# Patient Record
Sex: MALE | Race: WHITE | NOT HISPANIC OR LATINO | URBAN - METROPOLITAN AREA
[De-identification: names, ages, dates, MRNs, and addresses within clinical notes are randomized per-mention and may not be internally consistent; named-entity substitution may affect disease eponyms.]

---

## 2017-02-02 ENCOUNTER — OUTPATIENT (OUTPATIENT)
Dept: OUTPATIENT SERVICES | Facility: HOSPITAL | Age: 22
LOS: 1 days | Discharge: HOME | End: 2017-02-02

## 2017-06-27 DIAGNOSIS — Z79.899 OTHER LONG TERM (CURRENT) DRUG THERAPY: ICD-10-CM

## 2017-07-10 ENCOUNTER — OUTPATIENT (OUTPATIENT)
Dept: OUTPATIENT SERVICES | Facility: HOSPITAL | Age: 22
LOS: 1 days | Discharge: HOME | End: 2017-07-10

## 2017-07-10 DIAGNOSIS — Z00.00 ENCOUNTER FOR GENERAL ADULT MEDICAL EXAMINATION WITHOUT ABNORMAL FINDINGS: ICD-10-CM

## 2018-07-15 ENCOUNTER — OUTPATIENT (OUTPATIENT)
Dept: OUTPATIENT SERVICES | Facility: HOSPITAL | Age: 23
LOS: 1 days | Discharge: HOME | End: 2018-07-15

## 2018-07-15 DIAGNOSIS — I82.409 ACUTE EMBOLISM AND THROMBOSIS OF UNSPECIFIED DEEP VEINS OF UNSPECIFIED LOWER EXTREMITY: ICD-10-CM

## 2018-07-15 DIAGNOSIS — M79.604 PAIN IN RIGHT LEG: ICD-10-CM

## 2018-07-15 DIAGNOSIS — M79.605 PAIN IN LEFT LEG: ICD-10-CM

## 2020-10-11 ENCOUNTER — TRANSCRIPTION ENCOUNTER (OUTPATIENT)
Age: 25
End: 2020-10-11

## 2022-08-03 PROBLEM — Z00.00 ENCOUNTER FOR PREVENTIVE HEALTH EXAMINATION: Status: ACTIVE | Noted: 2022-08-03

## 2022-10-13 ENCOUNTER — APPOINTMENT (OUTPATIENT)
Dept: NEUROLOGY | Facility: CLINIC | Age: 27
End: 2022-10-13

## 2022-10-13 VITALS
HEIGHT: 60 IN | SYSTOLIC BLOOD PRESSURE: 141 MMHG | BODY MASS INDEX: 59.68 KG/M2 | DIASTOLIC BLOOD PRESSURE: 100 MMHG | WEIGHT: 304 LBS | HEART RATE: 120 BPM

## 2022-10-13 VITALS — HEIGHT: 68 IN | BODY MASS INDEX: 46.22 KG/M2

## 2022-10-13 DIAGNOSIS — R56.9 UNSPECIFIED CONVULSIONS: ICD-10-CM

## 2022-10-13 DIAGNOSIS — F84.0 AUTISTIC DISORDER: ICD-10-CM

## 2022-10-13 PROCEDURE — 99213 OFFICE O/P EST LOW 20 MIN: CPT

## 2022-10-13 RX ORDER — ARIPIPRAZOLE 10 MG/1
10 TABLET ORAL DAILY
Qty: 90 | Refills: 1 | Status: ACTIVE | COMMUNITY
Start: 2022-10-13 | End: 1900-01-01

## 2022-10-13 RX ORDER — DIVALPROEX SODIUM 500 MG/1
500 TABLET, DELAYED RELEASE ORAL TWICE DAILY
Qty: 180 | Refills: 1 | Status: ACTIVE | COMMUNITY
Start: 2022-10-13 | End: 1900-01-01

## 2022-10-13 NOTE — HISTORY OF PRESENT ILLNESS
[FreeTextEntry1] : History of Present Illness:  I had the pleasure of seeing Mr. Angeles accompanied by his mom today in follow up. His previous history and physical findings have been reviewed.\par \par He is under our care in regards to his epilepsy that is controlled on carbamazepine as well as ADHD and autistic spectrum disorder which are chronic conditions he is receiving continuing active treatment for. As mentioned, he is seizure free on carbamazepine 200 mg, he only takes one capsule in the evening. He has had no seizures. He is on carbamazepine for years and as a result, we would like to continue as is without change.\par \par With respect to his ADHD and mood issues, he was previously under the care of a psychiatrist who added Abilify 10 mg daily to address his mood issues which he takes in conjunction with Depakote er 500 mg BID and Strattera 60mg which have somewhat helped his behavior and focus. This psychiatrist has since retired.

## 2022-10-13 NOTE — ASSESSMENT
[FreeTextEntry1] : This is a 25 year old male with Seizures and Autism controlled with medication. We will continue carbamazepine as mentioned and f/u in 6 months for re evaluation. In regards to the medication prescribed by his psychiatrist specifically ,Abilify 10 mg,  we will refill for a 6 months only, will be further monitored and prescribed by a new psychiatrist as previous retired. Patient will complete bloodwork at this time. If there is any issue patient's mother is aware she can contact the office.\par \par Berenice SOLIS, attest that this documentation has been prepared under the direction and in the presence of Provider Bassam Fuchs DO\marcela \par \par Thank you for allowing me to assist in the management of this patient.\par \par \par Bassam Fuchs DO\marcela Board Certified, Neurology\par

## 2023-02-15 ENCOUNTER — RX RENEWAL (OUTPATIENT)
Age: 28
End: 2023-02-15

## 2023-04-13 RX ORDER — CARBAMAZEPINE 200 MG/1
200 CAPSULE, EXTENDED RELEASE ORAL
Qty: 90 | Refills: 1 | Status: ACTIVE | COMMUNITY
Start: 2022-10-13 | End: 1900-01-01

## 2023-04-21 ENCOUNTER — APPOINTMENT (OUTPATIENT)
Dept: PULMONOLOGY | Facility: CLINIC | Age: 28
End: 2023-04-21

## 2023-05-16 DIAGNOSIS — F90.9 ATTENTION-DEFICIT HYPERACTIVITY DISORDER, UNSPECIFIED TYPE: ICD-10-CM

## 2023-05-16 RX ORDER — ATOMOXETINE 60 MG/1
60 CAPSULE ORAL
Qty: 90 | Refills: 1 | Status: ACTIVE | COMMUNITY
Start: 2022-10-13 | End: 1900-01-01

## 2023-05-17 ENCOUNTER — APPOINTMENT (OUTPATIENT)
Dept: NEUROLOGY | Facility: CLINIC | Age: 28
End: 2023-05-17

## 2023-05-22 ENCOUNTER — APPOINTMENT (OUTPATIENT)
Dept: ORTHOPEDIC SURGERY | Facility: CLINIC | Age: 28
End: 2023-05-22
Payer: MEDICARE

## 2023-05-22 ENCOUNTER — NON-APPOINTMENT (OUTPATIENT)
Age: 28
End: 2023-05-22

## 2023-05-22 DIAGNOSIS — S93.492A SPRAIN OF OTHER LIGAMENT OF LEFT ANKLE, INITIAL ENCOUNTER: ICD-10-CM

## 2023-05-22 PROCEDURE — 99203 OFFICE O/P NEW LOW 30 MIN: CPT | Mod: 25

## 2023-05-22 PROCEDURE — L4350: CPT | Mod: LT

## 2023-05-22 PROCEDURE — 29075 APPL CST ELBW FNGR SHORT ARM: CPT

## 2023-05-22 PROCEDURE — 73110 X-RAY EXAM OF WRIST: CPT | Mod: LT

## 2023-05-22 PROCEDURE — 73610 X-RAY EXAM OF ANKLE: CPT | Mod: LT

## 2023-05-22 NOTE — HISTORY OF PRESENT ILLNESS
[de-identified] : 27-year-old male here for an evaluation of injury sustained to the left wrist and left ankle.\par Patient fell down about a week ago injuring his left knee, left ankle and left wrist.\par Patient has been nursing the medial knee feels better, at this time he continues having pain on his left wrist and left ankle.\par

## 2023-05-22 NOTE — DISCUSSION/SUMMARY
[de-identified] : Impression: \par Left ankle sprain.\par Injury to the left wrist, suspecting a nondisplaced fracture of the cuboid.\par \par Plan:\par Left ankle: Patient was advised for the use of an Aircast.\par Patient was advised for the use of the Aircast for about 2 to 3 weeks.\par Weightbearing as tolerated,  activity as tolerated.\par \par Left wrist possible nondisplaced fracture of the scaphoid.\par Patient was advised for immobilization with a thumb spica cast, patient agrees.\par Patient was placed in a short arm cast, cast was placed from from the elbow to the hand.\par Fiberglass material was used.\par Patient was advised to keep the cast clean and dry.\par Patient was also advised for an MRI of the left wrist to confirm the nondisplaced fracture of the scaphoid.\par \par At this time patient is unable to return to work until further notice.\par \par \par Follow-up: 2 weeks for repeat evaluation with our hand specialist\par \par

## 2023-05-22 NOTE — IMAGING
[de-identified] : On examination of the left wrist, patient has some swelling on about the radial aspect of the carpal bones.\par Patient has good range of motion to dorsiflexion and volar flexion with mild end of range pain.\par Patient has good range of motion about the digits.\par No significant tenderness when palpating over the distal radius.\par The point of maximal tenderness is about the snuffbox.\par Neurovascular intact.\par \par On examination of the left ankle, patient has mild swelling over the lateral aspect of the ankle.\par No ecchymosis, no erythema.\par Calf soft, nontender.\par Patient has tender when palpating over the ATFL, nontender about the CFL or the PT FL.\par Nontender the deltoid ligament.\par Nontender over the medial or lateral malleolus.\par No signs of instability about the ankle.\par No pain when stressing the lateral ligaments.\par Neurovascular intact.\par \par X-ray of the left wrist with scaphoid view shows a lucency on about the scaphoid suggesting a nondisplaced fracture of the scaphoid\par \par X-ray of the left ankle, negative for any acute fracture or dislocation.

## 2023-05-24 ENCOUNTER — NON-APPOINTMENT (OUTPATIENT)
Age: 28
End: 2023-05-24

## 2023-06-01 ENCOUNTER — APPOINTMENT (OUTPATIENT)
Dept: ORTHOPEDIC SURGERY | Facility: CLINIC | Age: 28
End: 2023-06-01

## 2023-06-08 ENCOUNTER — APPOINTMENT (OUTPATIENT)
Dept: ORTHOPEDIC SURGERY | Facility: CLINIC | Age: 28
End: 2023-06-08
Payer: MEDICARE

## 2023-06-08 ENCOUNTER — NON-APPOINTMENT (OUTPATIENT)
Age: 28
End: 2023-06-08

## 2023-06-08 VITALS — HEIGHT: 68 IN | WEIGHT: 304 LBS | BODY MASS INDEX: 46.07 KG/M2

## 2023-06-08 DIAGNOSIS — M25.532 PAIN IN LEFT WRIST: ICD-10-CM

## 2023-06-08 PROCEDURE — L3809: CPT | Mod: LT

## 2023-06-08 PROCEDURE — 99212 OFFICE O/P EST SF 10 MIN: CPT

## 2023-06-08 NOTE — HISTORY OF PRESENT ILLNESS
[de-identified] : 27-year-old male had a fall resulting injury to his left wrist.  He was seen in the office to concern for fracture and he comes in for evaluation today.  MRI was ordered and it is been done as of yet.  He is not complaining of any pain or discomfort in the cast.  MRI is pending for next week.

## 2023-06-08 NOTE — DATA REVIEWED
[FreeTextEntry1] : I reviewed his previous radiographs showing no fracture dislocation no destructive lesions

## 2023-06-08 NOTE — PHYSICAL EXAM
[de-identified] : I removed him from his cast.  She has no swelling erythema ecchymoses or abrasions.  Patient has no anatomic snuffbox tenderness pain.  Patient has no pain about the scaphoid.

## 2023-06-08 NOTE — ASSESSMENT
[FreeTextEntry1] : 27-year-old male with left-sided wrist pain discomfort.  MRI is pending.  I placed him into a thumb spica brace he will get the MRI done.  Then if the MRI shows a fracture he will be placed into a waterproof cast.  If the MRI does not show a fracture he can be discharged from care.  We gave the option of staying in the cast while we await the MRI but after discussion with the patient and his mother and myself we elected to remove the cast and then if the MRI shows a fracture be recasted the need for a cast for this type of injury was discussed

## 2023-06-14 ENCOUNTER — RESULT REVIEW (OUTPATIENT)
Age: 28
End: 2023-06-14

## 2023-06-14 ENCOUNTER — OUTPATIENT (OUTPATIENT)
Dept: OUTPATIENT SERVICES | Facility: HOSPITAL | Age: 28
LOS: 1 days | End: 2023-06-14
Payer: MEDICARE

## 2023-06-14 DIAGNOSIS — Z00.8 ENCOUNTER FOR OTHER GENERAL EXAMINATION: ICD-10-CM

## 2023-06-14 DIAGNOSIS — M25.532 PAIN IN LEFT WRIST: ICD-10-CM

## 2023-06-14 PROCEDURE — 73221 MRI JOINT UPR EXTREM W/O DYE: CPT | Mod: 26,LT

## 2023-06-14 PROCEDURE — 73221 MRI JOINT UPR EXTREM W/O DYE: CPT | Mod: LT

## 2023-06-15 ENCOUNTER — NON-APPOINTMENT (OUTPATIENT)
Age: 28
End: 2023-06-15

## 2023-06-15 DIAGNOSIS — M25.532 PAIN IN LEFT WRIST: ICD-10-CM

## 2024-03-21 ENCOUNTER — APPOINTMENT (OUTPATIENT)
Dept: CARDIOLOGY | Facility: CLINIC | Age: 29
End: 2024-03-21
Payer: MEDICARE

## 2024-03-21 VITALS
SYSTOLIC BLOOD PRESSURE: 124 MMHG | OXYGEN SATURATION: 94 % | WEIGHT: 298 LBS | DIASTOLIC BLOOD PRESSURE: 86 MMHG | HEART RATE: 112 BPM | HEIGHT: 69 IN | BODY MASS INDEX: 44.14 KG/M2

## 2024-03-21 DIAGNOSIS — R73.03 PREDIABETES.: ICD-10-CM

## 2024-03-21 DIAGNOSIS — E66.01 MORBID (SEVERE) OBESITY DUE TO EXCESS CALORIES: ICD-10-CM

## 2024-03-21 DIAGNOSIS — E78.1 PURE HYPERGLYCERIDEMIA: ICD-10-CM

## 2024-03-21 PROCEDURE — 93000 ELECTROCARDIOGRAM COMPLETE: CPT

## 2024-03-21 PROCEDURE — 99204 OFFICE O/P NEW MOD 45 MIN: CPT

## 2024-03-21 PROCEDURE — G2211 COMPLEX E/M VISIT ADD ON: CPT

## 2024-03-21 RX ORDER — ROSUVASTATIN CALCIUM 5 MG/1
5 TABLET, FILM COATED ORAL
Qty: 30 | Refills: 5 | Status: ACTIVE | COMMUNITY
Start: 2024-03-21 | End: 1900-01-01

## 2024-03-22 NOTE — HISTORY OF PRESENT ILLNESS
[FreeTextEntry1] : CARLOS ORTIZ is a 28-year-old male, with a PMHx significant for hypertriglyceridemia, pre-DM, seizure disorder, who presents today for cardiac evaluation. Patient reports he does not exercise and has not been compliant with diet. Patient recently had lab work done, revealing elevated triglycerides of 355 and an LDL of 125. Otherwise: (-) chest pain, (-) SOB.  Family History: (+) CAD: mother, (+) DM: mother, father, (+) HLD: mother, (+) Liver CA: father.  Social History: (-) Smoking, (-) EtOH.  Labs from 3/4/2024 reviewed:  Total Cholesterol: 211 HDL: 35 Triglycerides: 355 LDL: 125 Chol/HDLC Ratio: 6.0 Non-HDL-C: 176 --------------------------------- Glucose: 112 Hgb A1c: 6.0

## 2024-03-22 NOTE — DISCUSSION/SUMMARY
[EKG obtained to assist in diagnosis and management of assessed problem(s)] : EKG obtained to assist in diagnosis and management of assessed problem(s) [FreeTextEntry1] : EKG performed today revealed ST @ 111 BPM, (-) ST-T wave changes.   Hypertriglyceridemia: The impression is hyperlipidemia. Currently, the condition is uncontrolled. Encouraged the patient to lose weight, diet, and exercise; however, he does not express any interest in weight loss. States he would rather take medication. Recommend starting Crestor 5 mg QD. Lab requisition provided to check CMP and Lipid Profile. Will follow up with the patient when labs are completed and up titrate medication as needed to obtain triglyceride of 150.   Obesity: Discussed risks of obesity with the patient. Counselled on weight loss with dietary modification and increased physical activity/exercise.  Plan was discussed with the patient.   I, Dr. Fontana, personally performed the evaluation and management services for this patient. That E&M includes reviewing prior history/tests, conducting the medically appropriate examination and evaluation, assessing all conditions, establishing a plan of care, ordering medications/labs, and counselling and educating the patient. I spent a total of 45 minutes on today's encounter evaluating and treating the patient.

## 2024-06-04 ENCOUNTER — APPOINTMENT (OUTPATIENT)
Dept: ORTHOPEDIC SURGERY | Facility: CLINIC | Age: 29
End: 2024-06-04
Payer: MEDICARE

## 2024-06-04 DIAGNOSIS — M25.551 PAIN IN RIGHT HIP: ICD-10-CM

## 2024-06-04 PROCEDURE — 73502 X-RAY EXAM HIP UNI 2-3 VIEWS: CPT

## 2024-06-04 PROCEDURE — 99213 OFFICE O/P EST LOW 20 MIN: CPT

## 2024-06-04 PROCEDURE — 99203 OFFICE O/P NEW LOW 30 MIN: CPT

## 2024-06-04 PROCEDURE — 73552 X-RAY EXAM OF FEMUR 2/>: CPT | Mod: RT

## 2024-06-04 RX ORDER — MELOXICAM 7.5 MG/1
7.5 TABLET ORAL
Qty: 30 | Refills: 0 | Status: ACTIVE | COMMUNITY
Start: 2024-06-04 | End: 1900-01-01

## 2024-06-04 NOTE — HISTORY OF PRESENT ILLNESS
[de-identified] : 28-year-old gentleman here for evaluation of right hip pain He is accompanied by his uncle.  He states he had a fall about a year and a half ago. He been having some pain. He localizes the pain to the mid thigh/proximal thigh area. Mainly anterior and lateral. Notes pain with stairs.  Past medical history is on chart for review.  Exam shows no pain with passive range of motion of the hip or leg. No swelling in the thigh. No ecchymosis or signs of trauma.  X-rays were taken the office today.  AP of the pelvis and frog lateral of each hip as well as an AP and lateral composite view of the right femur show no acute bony injuries.  Joint appears to be in good alignment.  Impression is right hip/thigh pain, no signs of acute trauma, x-rays negative for any bony issues. Will give a 4-week trial of meloxicam, also recommended weight loss, current weight is 310. I like to see him back in 6 weeks for reevaluation. If no improvement in symptoms can consider more advanced imaging.

## 2024-06-18 ENCOUNTER — APPOINTMENT (OUTPATIENT)
Dept: ORTHOPEDIC SURGERY | Facility: CLINIC | Age: 29
End: 2024-06-18
Payer: MEDICARE

## 2024-06-18 VITALS — BODY MASS INDEX: 46.38 KG/M2 | HEIGHT: 68 IN | WEIGHT: 306 LBS

## 2024-06-18 DIAGNOSIS — S69.92XA UNSPECIFIED INJURY OF LEFT WRIST, HAND AND FINGER(S), INITIAL ENCOUNTER: ICD-10-CM

## 2024-06-18 PROCEDURE — 73110 X-RAY EXAM OF WRIST: CPT | Mod: LT

## 2024-06-18 PROCEDURE — 99213 OFFICE O/P EST LOW 20 MIN: CPT | Mod: 25

## 2024-06-18 PROCEDURE — L3908: CPT | Mod: LT

## 2024-06-18 NOTE — IMAGING
[de-identified] : On examination of the left wrist mild swelling, no ecchymosis, no erythema.  Skin is intact.  Tenderness is noted over the dorsal wrist around the distal radial ulnar joint.  No tenderness over the radius or snuffbox.  Mild tenderness over the ulnar styloid and TFCC.  No tenderness over the metacarpals or fingers.  Able to make a full fist.  Pain to wrist flexion and extension.  X-ray left wrist in the office today no obvious fracture or dislocation  Prior MRI 2023 no fracture.  Mild sprain/partial tear of the dorsal extrinsic ligament

## 2024-06-18 NOTE — HISTORY OF PRESENT ILLNESS
[de-identified] : 28-year-old male comes in today for evaluation of his left wrist pain and injury that occurred on June 14.  Patient states that he got angry and hit his on hand and wrist into the wall.  Accompanied by his mom and cousin today.  Patient had a prior wrist injury 2023 so Dr. Noonan treated in a brace

## 2024-06-18 NOTE — ASSESSMENT
[FreeTextEntry1] : Patient was placed into a cock-up wrist brace.  He has meloxicam already from a hip issue.  Follow-up in a few weeks for repeat evaluation also further imaging if pain worsens or continues.

## 2024-07-13 ENCOUNTER — RX RENEWAL (OUTPATIENT)
Age: 29
End: 2024-07-13

## 2024-07-25 ENCOUNTER — APPOINTMENT (OUTPATIENT)
Dept: NEUROLOGY | Facility: CLINIC | Age: 29
End: 2024-07-25
Payer: MEDICARE

## 2024-07-25 VITALS
SYSTOLIC BLOOD PRESSURE: 132 MMHG | HEART RATE: 111 BPM | WEIGHT: 306 LBS | HEIGHT: 68 IN | BODY MASS INDEX: 46.38 KG/M2 | DIASTOLIC BLOOD PRESSURE: 80 MMHG

## 2024-07-25 DIAGNOSIS — T88.7XXA UNSPECIFIED ADVERSE EFFECT OF DRUG OR MEDICAMENT, INITIAL ENCOUNTER: ICD-10-CM

## 2024-07-25 DIAGNOSIS — F90.9 ATTENTION-DEFICIT HYPERACTIVITY DISORDER, UNSPECIFIED TYPE: ICD-10-CM

## 2024-07-25 DIAGNOSIS — R56.9 UNSPECIFIED CONVULSIONS: ICD-10-CM

## 2024-07-25 DIAGNOSIS — F84.0 AUTISTIC DISORDER: ICD-10-CM

## 2024-07-25 PROCEDURE — 99214 OFFICE O/P EST MOD 30 MIN: CPT

## 2024-07-25 PROCEDURE — G2211 COMPLEX E/M VISIT ADD ON: CPT

## 2024-07-25 NOTE — ASSESSMENT
[FreeTextEntry1] : Seizures -Can be well-controlled on carbamazepine alone, no need for additional medications for the purpose of seizure control.  The use of topiramate would only be for weight loss purposes.  Autistic spectrum disorder/ADHD -Mother advised to find a psychiatrist for this since it took 3 different medications in the past to control his behavior including antipsychotics - I recommend continue with the current medication despite the side effect of weight gain from Depakote until patient is under the care of a psychiatrist and has a replacement to control his behavior since mom already has trouble taking care of/controlling him even with the Depakote   Total clinician time spent  is  31 minutes including preparing to see the patient, obtaining and/or reviewing and confirming history, performing a medically necessary and appropriate examination, counseling and educating the patient and/or family, documenting clinical information in the HER and communicating and/or referring to other healthcare professionals.      Signature. WILL, Sayra Monroe attest that this document has been prepared under the direction and in the presence of Provider Sergey Fuchs DO   Thank You for letting me assist in the management of this patient.   Bassam Fuchs DO Board Certified, Neurology

## 2024-07-25 NOTE — HISTORY OF PRESENT ILLNESS
[FreeTextEntry1] : Mr. CARLOS ORTIZ returns to the office for follow-up and his prior history and physical have been reviewed and he reports no change since last visit.  He has been seeing us for seizure disorder, ADHD, autistic spectrum disorder, first with pediatric neurology and now adult neurology.  His seizure has been well-controlled on carbamazepine only, and mom says it has been at least 5 years since he had a seizure.  He is on additional medication prescribed by his previous psychiatrist for mood and behavior control including Abilify, Depakote, Strattera.  Patient has had significant weight gain which was attributed now to Depakote and his primary doctor recommending switching Depakote to topiramate.   Patient's previous psychiatrist retired and mom has trouble finding a replacement.  Mom does notice it is getting harder to control patient's behavior and he has heard himself doing activities.  He recently saw orthopedics for his wrist and hip.    Patient had the Depakote level checked earlier this year which was 66.

## 2024-08-15 ENCOUNTER — APPOINTMENT (OUTPATIENT)
Dept: ORTHOPEDIC SURGERY | Facility: CLINIC | Age: 29
End: 2024-08-15

## 2024-09-23 ENCOUNTER — APPOINTMENT (OUTPATIENT)
Dept: ORTHOPEDIC SURGERY | Facility: CLINIC | Age: 29
End: 2024-09-23

## 2024-09-25 ENCOUNTER — APPOINTMENT (OUTPATIENT)
Dept: NEUROLOGY | Facility: CLINIC | Age: 29
End: 2024-09-25

## 2024-09-26 ENCOUNTER — APPOINTMENT (OUTPATIENT)
Facility: CLINIC | Age: 29
End: 2024-09-26

## 2024-09-26 ENCOUNTER — RX RENEWAL (OUTPATIENT)
Age: 29
End: 2024-09-26

## 2024-09-26 DIAGNOSIS — M70.61 TROCHANTERIC BURSITIS, RIGHT HIP: ICD-10-CM

## 2024-09-26 PROCEDURE — 99213 OFFICE O/P EST LOW 20 MIN: CPT | Mod: 25

## 2024-09-26 PROCEDURE — 20610 DRAIN/INJ JOINT/BURSA W/O US: CPT | Mod: RT

## 2024-09-26 NOTE — HISTORY OF PRESENT ILLNESS
[de-identified] : f/u of rt hip ttp over gt and IT band c/w bursitis plan for inj Prior to injection, risks and benefits of the procedure were discussed, including but not limited to pain, swelling, hematoma, failure to improve symptoms, and in rare cases infection or allergic reaction. The patient lay in the lateral position with the affected hip up. The trochanter was palpated for the point of maximum tenderness and was then prepped and draped with betadine and alcohol. Using sterile technique 1cc of 40mg/cc kenalog solution mixed with 4cc of 1% lidocaine was injected around the area of tenderness with a 22 guage needle. Upon withdrawing the needle pressure was applied to the injection site for approximately 1 minute. Once hemostasis was achieved a band-aid dressing was applied. The patient tolerated the procedure well.

## 2024-10-07 ENCOUNTER — APPOINTMENT (OUTPATIENT)
Dept: ORTHOPEDIC SURGERY | Facility: CLINIC | Age: 29
End: 2024-10-07

## 2024-11-01 ENCOUNTER — APPOINTMENT (OUTPATIENT)
Dept: ORTHOPEDIC SURGERY | Facility: CLINIC | Age: 29
End: 2024-11-01
Payer: MEDICARE

## 2024-11-01 DIAGNOSIS — M25.551 PAIN IN RIGHT HIP: ICD-10-CM

## 2024-11-01 PROCEDURE — 99213 OFFICE O/P EST LOW 20 MIN: CPT

## 2024-11-08 ENCOUNTER — RESULT REVIEW (OUTPATIENT)
Age: 29
End: 2024-11-08

## 2024-11-08 ENCOUNTER — OUTPATIENT (OUTPATIENT)
Dept: OUTPATIENT SERVICES | Facility: HOSPITAL | Age: 29
LOS: 1 days | End: 2024-11-08
Payer: MEDICARE

## 2024-11-08 DIAGNOSIS — M25.551 PAIN IN RIGHT HIP: ICD-10-CM

## 2024-11-08 DIAGNOSIS — Z00.8 ENCOUNTER FOR OTHER GENERAL EXAMINATION: ICD-10-CM

## 2024-11-08 PROCEDURE — 73721 MRI JNT OF LWR EXTRE W/O DYE: CPT | Mod: 26,RT

## 2024-11-08 PROCEDURE — 73721 MRI JNT OF LWR EXTRE W/O DYE: CPT | Mod: RT

## 2024-11-09 DIAGNOSIS — M25.551 PAIN IN RIGHT HIP: ICD-10-CM

## 2024-12-16 ENCOUNTER — RX RENEWAL (OUTPATIENT)
Age: 29
End: 2024-12-16

## 2024-12-19 ENCOUNTER — APPOINTMENT (OUTPATIENT)
Dept: ORTHOPEDIC SURGERY | Facility: CLINIC | Age: 29
End: 2024-12-19

## 2025-02-07 ENCOUNTER — APPOINTMENT (OUTPATIENT)
Dept: ORTHOPEDIC SURGERY | Facility: CLINIC | Age: 30
End: 2025-02-07

## 2025-02-18 ENCOUNTER — RX RENEWAL (OUTPATIENT)
Age: 30
End: 2025-02-18

## 2025-02-25 ENCOUNTER — APPOINTMENT (OUTPATIENT)
Dept: SURGERY | Facility: CLINIC | Age: 30
End: 2025-02-25
Payer: MEDICARE

## 2025-02-25 VITALS — HEIGHT: 68 IN | BODY MASS INDEX: 45.92 KG/M2 | WEIGHT: 303 LBS

## 2025-02-25 DIAGNOSIS — E66.01 MORBID (SEVERE) OBESITY DUE TO EXCESS CALORIES: ICD-10-CM

## 2025-02-25 DIAGNOSIS — K43.6 OTHER AND UNSPECIFIED VENTRAL HERNIA WITH OBSTRUCTION, W/OUT GANGRENE: ICD-10-CM

## 2025-02-25 PROCEDURE — 99203 OFFICE O/P NEW LOW 30 MIN: CPT

## 2025-03-12 ENCOUNTER — OUTPATIENT (OUTPATIENT)
Dept: OUTPATIENT SERVICES | Facility: HOSPITAL | Age: 30
LOS: 1 days | End: 2025-03-12
Payer: MEDICARE

## 2025-03-12 VITALS
HEART RATE: 99 BPM | TEMPERATURE: 98 F | WEIGHT: 306 LBS | DIASTOLIC BLOOD PRESSURE: 89 MMHG | OXYGEN SATURATION: 99 % | HEIGHT: 71 IN | SYSTOLIC BLOOD PRESSURE: 133 MMHG | RESPIRATION RATE: 20 BRPM

## 2025-03-12 DIAGNOSIS — Z98.890 OTHER SPECIFIED POSTPROCEDURAL STATES: Chronic | ICD-10-CM

## 2025-03-12 DIAGNOSIS — Z90.89 ACQUIRED ABSENCE OF OTHER ORGANS: Chronic | ICD-10-CM

## 2025-03-12 DIAGNOSIS — Z01.818 ENCOUNTER FOR OTHER PREPROCEDURAL EXAMINATION: ICD-10-CM

## 2025-03-12 DIAGNOSIS — K43.6 OTHER AND UNSPECIFIED VENTRAL HERNIA WITH OBSTRUCTION, WITHOUT GANGRENE: ICD-10-CM

## 2025-03-12 LAB
ALBUMIN SERPL ELPH-MCNC: 4.4 G/DL — SIGNIFICANT CHANGE UP (ref 3.5–5.2)
ALP SERPL-CCNC: 56 U/L — SIGNIFICANT CHANGE UP (ref 30–115)
ALT FLD-CCNC: 23 U/L — SIGNIFICANT CHANGE UP (ref 0–41)
ANION GAP SERPL CALC-SCNC: 14 MMOL/L — SIGNIFICANT CHANGE UP (ref 7–14)
APPEARANCE UR: CLEAR — SIGNIFICANT CHANGE UP
AST SERPL-CCNC: 18 U/L — SIGNIFICANT CHANGE UP (ref 0–41)
BASOPHILS # BLD AUTO: 0.06 K/UL — SIGNIFICANT CHANGE UP (ref 0–0.2)
BASOPHILS NFR BLD AUTO: 0.8 % — SIGNIFICANT CHANGE UP (ref 0–1)
BILIRUB SERPL-MCNC: 0.6 MG/DL — SIGNIFICANT CHANGE UP (ref 0.2–1.2)
BILIRUB UR-MCNC: NEGATIVE — SIGNIFICANT CHANGE UP
BUN SERPL-MCNC: 12 MG/DL — SIGNIFICANT CHANGE UP (ref 10–20)
CALCIUM SERPL-MCNC: 9 MG/DL — SIGNIFICANT CHANGE UP (ref 8.4–10.5)
CHLORIDE SERPL-SCNC: 103 MMOL/L — SIGNIFICANT CHANGE UP (ref 98–110)
CO2 SERPL-SCNC: 26 MMOL/L — SIGNIFICANT CHANGE UP (ref 17–32)
COLOR SPEC: SIGNIFICANT CHANGE UP
CREAT SERPL-MCNC: 0.9 MG/DL — SIGNIFICANT CHANGE UP (ref 0.7–1.5)
DIFF PNL FLD: NEGATIVE — SIGNIFICANT CHANGE UP
EGFR: 119 ML/MIN/1.73M2 — SIGNIFICANT CHANGE UP
EGFR: 119 ML/MIN/1.73M2 — SIGNIFICANT CHANGE UP
EOSINOPHIL # BLD AUTO: 0.3 K/UL — SIGNIFICANT CHANGE UP (ref 0–0.7)
EOSINOPHIL NFR BLD AUTO: 4 % — SIGNIFICANT CHANGE UP (ref 0–8)
GLUCOSE SERPL-MCNC: 84 MG/DL — SIGNIFICANT CHANGE UP (ref 70–99)
GLUCOSE UR QL: NEGATIVE MG/DL — SIGNIFICANT CHANGE UP
HCT VFR BLD CALC: 48.6 % — SIGNIFICANT CHANGE UP (ref 42–52)
HCV AB S/CO SERPL IA: 0.05 COI — SIGNIFICANT CHANGE UP
HCV AB SERPL-IMP: SIGNIFICANT CHANGE UP
HGB BLD-MCNC: 16.3 G/DL — SIGNIFICANT CHANGE UP (ref 14–18)
IMM GRANULOCYTES NFR BLD AUTO: 0.8 % — HIGH (ref 0.1–0.3)
KETONES UR-MCNC: NEGATIVE MG/DL — SIGNIFICANT CHANGE UP
LEUKOCYTE ESTERASE UR-ACNC: NEGATIVE — SIGNIFICANT CHANGE UP
LYMPHOCYTES # BLD AUTO: 1.72 K/UL — SIGNIFICANT CHANGE UP (ref 1.2–3.4)
LYMPHOCYTES # BLD AUTO: 23.2 % — SIGNIFICANT CHANGE UP (ref 20.5–51.1)
MCHC RBC-ENTMCNC: 29.4 PG — SIGNIFICANT CHANGE UP (ref 27–31)
MCHC RBC-ENTMCNC: 33.5 G/DL — SIGNIFICANT CHANGE UP (ref 32–37)
MCV RBC AUTO: 87.6 FL — SIGNIFICANT CHANGE UP (ref 80–94)
MONOCYTES # BLD AUTO: 0.67 K/UL — HIGH (ref 0.1–0.6)
MONOCYTES NFR BLD AUTO: 9 % — SIGNIFICANT CHANGE UP (ref 1.7–9.3)
MRSA PCR RESULT.: NEGATIVE — SIGNIFICANT CHANGE UP
NEUTROPHILS # BLD AUTO: 4.6 K/UL — SIGNIFICANT CHANGE UP (ref 1.4–6.5)
NEUTROPHILS NFR BLD AUTO: 62.2 % — SIGNIFICANT CHANGE UP (ref 42.2–75.2)
NITRITE UR-MCNC: NEGATIVE — SIGNIFICANT CHANGE UP
NRBC BLD AUTO-RTO: 0 /100 WBCS — SIGNIFICANT CHANGE UP (ref 0–0)
PH UR: 5.5 — SIGNIFICANT CHANGE UP (ref 5–8)
PLATELET # BLD AUTO: 201 K/UL — SIGNIFICANT CHANGE UP (ref 130–400)
PMV BLD: 9.1 FL — SIGNIFICANT CHANGE UP (ref 7.4–10.4)
POTASSIUM SERPL-MCNC: 4.4 MMOL/L — SIGNIFICANT CHANGE UP (ref 3.5–5)
POTASSIUM SERPL-SCNC: 4.4 MMOL/L — SIGNIFICANT CHANGE UP (ref 3.5–5)
PROT SERPL-MCNC: 7.3 G/DL — SIGNIFICANT CHANGE UP (ref 6–8)
PROT UR-MCNC: SIGNIFICANT CHANGE UP MG/DL
RBC # BLD: 5.55 M/UL — SIGNIFICANT CHANGE UP (ref 4.7–6.1)
RBC # FLD: 12.9 % — SIGNIFICANT CHANGE UP (ref 11.5–14.5)
SODIUM SERPL-SCNC: 143 MMOL/L — SIGNIFICANT CHANGE UP (ref 135–146)
SP GR SPEC: 1.03 — SIGNIFICANT CHANGE UP (ref 1–1.03)
UROBILINOGEN FLD QL: 1 MG/DL — SIGNIFICANT CHANGE UP (ref 0.2–1)
WBC # BLD: 7.41 K/UL — SIGNIFICANT CHANGE UP (ref 4.8–10.8)
WBC # FLD AUTO: 7.41 K/UL — SIGNIFICANT CHANGE UP (ref 4.8–10.8)

## 2025-03-12 PROCEDURE — 93010 ELECTROCARDIOGRAM REPORT: CPT

## 2025-03-12 PROCEDURE — 87641 MR-STAPH DNA AMP PROBE: CPT

## 2025-03-12 PROCEDURE — 80053 COMPREHEN METABOLIC PANEL: CPT

## 2025-03-12 PROCEDURE — 86803 HEPATITIS C AB TEST: CPT

## 2025-03-12 PROCEDURE — 99214 OFFICE O/P EST MOD 30 MIN: CPT | Mod: 25

## 2025-03-12 PROCEDURE — 93005 ELECTROCARDIOGRAM TRACING: CPT

## 2025-03-12 PROCEDURE — 81003 URINALYSIS AUTO W/O SCOPE: CPT

## 2025-03-12 PROCEDURE — 36415 COLL VENOUS BLD VENIPUNCTURE: CPT

## 2025-03-12 PROCEDURE — 87640 STAPH A DNA AMP PROBE: CPT

## 2025-03-12 PROCEDURE — 85025 COMPLETE CBC W/AUTO DIFF WBC: CPT

## 2025-03-12 NOTE — H&P PST ADULT - NSICDXFAMILYHX_GEN_ALL_CORE_FT
FAMILY HISTORY:  Father  Still living? No  FH: liver cancer, Age at diagnosis: Age Unknown    Mother  Still living? Yes, Estimated age: Age Unknown  FH: diabetes mellitus, Age at diagnosis: Age Unknown  FH: heart attack, Age at diagnosis: Age Unknown

## 2025-03-12 NOTE — H&P PST ADULT - NSICDXPASTSURGICALHX_GEN_ALL_CORE_FT
Telephone Encounter by Luis Sahu MD at 08/22/18 04:19 PM     Author:  Luis Sahu MD Service:  (none) Author Type:  Physician     Filed:  08/22/18 04:20 PM Encounter Date:  8/22/2018 Status:  Signed     :  Luis Sahu MD (Physician)            This should be fine.[KB1.1M]  Maxx[KB1.1T] is nearly 1 and at 1 honey is safe.  He is close enough that all we have to do is watch him.[KB1.1M]      Revision History        User Key Date/Time User Provider Type Action    > KB1.1 08/22/18 04:20 PM Luis Sahu MD Physician Sign    M - Manual, T - Template             PAST SURGICAL HISTORY:  H/O basal cell carcinoma excision     H/O myringotomy     History of tonsillectomy and adenoidectomy

## 2025-03-12 NOTE — H&P PST ADULT - HISTORY OF PRESENT ILLNESS
29y Male presents today for presurgical testing for  INCARCERATED VENTRAL HERNIA REPAIR WITH MESH. Per Sx note dated 2/25/25 "29-year-old gentleman presenting to the office with his mother with a past medical history significant for seizure disorder (his last seizure was 5 years ago), ADHD, autistic spectrum disorder, hypercholesterolemia, skin cancer status post excision, prediabetes, sleep apnea and anxiety along with morbid obesity (currently on Mounjaro) who presents to the office with pain and swelling above the umbilicus suspicious for a hernia. 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  ?  Physical examination demonstrates a golf ball size tender bulge 2 fingerbreadths above the umbilicus which is not reducible consistent with an incarcerated supraumbilical ventral hernia warranting surgical repair. There is no evidence of strangulation, and the patient denies any symptoms of obstruction. This hernia is complicated by a moderate rectus diastasis likely related to his excess abdominal weight. His current BMI is 46."  Patient and his mother states above is true and accurate. He offers no other complaints at this time. Now for scheduled procedure.   Of note patient's mother states patient last seizure was approximately 10 years ago.   ?  Patient/guardian denies any CP, palpitations, SOB, cough, or dysuria. No recent URI or UTI.  Stated exercise tolerance is FOS 3-4  BEBE screen reviewed    Patient/guardian denies any recent personal exposure to COVID19. Denies any sick contacts. Patient/guardian denies travel within the past 30 days. Patient was instructed to quarantine until after procedure.    Anesthesia Alert  YES--Difficult Airway - Class IV  NO--History of neck surgery or radiation  NO--Limited ROM of neck  NO--History of Malignant hyperthermia  NO--Personal or family history of Pseudocholinesterase deficiency.  NO--Prior Anesthesia Complication  NO--Latex Allergy  NO--Loose teeth  NO--History of Rheumatoid Arthritis  NO--BEBE  NO--Bleeding risk  NO--Other_____    [facility] Activity Status Index (DASI) from [email or web address]  on 3/12/2025  ** All calculations should be rechecked by clinician prior to use **    RESULT SUMMARY:  58.2 points  The higher the score (maximum 58.2), the higher the functional status.    9.89 METs        INPUTS:  Take care of self —> 2.75 = Yes  Walk indoors —> 1.75 = Yes  Walk 1&ndash;2 blocks on level ground —> 2.75 = Yes  Climb a flight of stairs or walk up a hill —> 5.5 = Yes  Run a short distance —> 8 = Yes  Do light work around the house —> 2.7 = Yes  Do moderate work around the house —> 3.5 = Yes  Do heavy work around the house —> 8 = Yes  Do yardwork —> 4.5 = Yes  Have sexual relations —> 5.25 = Yes  Participate in moderate recreational activities —> 6 = Yes  Participate in strenuous sports —> 7.5 = Yes    Revised Cardiac Risk Index for Pre-Operative Risk from [email or web address]  on 3/12/2025  ** All calculations should be rechecked by clinician prior to use **    RESULT SUMMARY:  1 points  RCRI Score    6.0 %  Risk of major cardiac event      INPUTS:  High-risk surgery —> 1 = Yes  History of ischemic heart disease —> 0 = No  History of congestive heart failure —> 0 = No  History of cerebrovascular disease —> 0 = No  Pre-operative treatment with insulin —> 0 = No  Pre-operative creatinine >2 mg/dL / 176.8 µmol/L —> 0 = No    Patient/guardian states that this is their complete medical history and list of medications.  Patient/guardian understands instructions given during this visit and was given the opportunity to ask questions and have them answered. They were instructed to follow up with their surgeon/surgeon's office with any questions regarding their procedure.

## 2025-03-12 NOTE — H&P PST ADULT - GENITOURINARY COMMENTS
"Daily Note     Today's date: 2023  Patient name: Tamica Sullivan  : 1948  MRN: [id number]  Referring provider: Hawa Garcia DO  Dx:   Encounter Diagnosis     ICD-10-CM    1  Recurrent falls  R29 6       2  Ambulatory dysfunction  R26 2           Start Time: 1430  Stop Time: 1510  Total time in clinic (min): 40 minutes      IE or PN  POC expires Auth Status Total     Start date  Expiration date PT/OT + Visit Limit? Co-Insurance   23 Approved  12 23 BOMN $35 Co-pay                                               Visit/Unit Tracking  AUTH Status: Date               Authed: [id number] Used 1 1             APPROVED 12 VISITS  Remaining  11 10 9                Subjective: No falls  No pain  Objective: See treatment diary below  TA:  Objective measures completed see below  FGA and 6 MWT    NMR:  -STS 2 foam pads x 10 reps and 5 reps  - *step ups 6\": L lead,  x 10 each 1 UE support Pbar  R unable to have good form with 2\" step up  -walking with 2 small TT x 2 min, 2nd rep with 2 5# R ankle    Seated exercises/active rest:  - LAQ R 3 sec hold 2 5 mph 10 reps x 2  -seated trunk rotation with 10# med ball      Assessment: Tolerated treatment well  Patient would benefit from continued PT Pt unable to step up a 2\" step leading with R LE due to pain and weakness  He substitutes with momentum/poor technique  Step ups will be L only for now  Pt reported only minimal R knee pain end of session  Plan: Continue per plan of care             Outcome Measures Initial Eval  23        5xSTS 23 44 sec        TUG  - Regular  - Cognitive   16 34 sec  19 97 sec        10 meter 14 28 ft/sec  0 70 m/s        ROMERO 41/56        FGA 16/30  7 37 sec 20'        DGI NT/24        mCTSIB  - FTEO (firm)  - FTEC (firm)  - FTEO (foam)  - FTEC (foam)   30 sec  30 sec  30 sec  30 sec        6MWT 830' Ft w/SBQC  81 HR  96 PO2 at end        UE strentgh Shoulders 4/5, elbows + wrists, fingers 5/5   " LE strength Hips 4/5,   IV 4/5 otherwise deferred

## 2025-03-12 NOTE — H&P PST ADULT - IN ACCORDANCE WITH [place] STATE LAW, WE OFFER EVERY PATIENT A HEPATITIS C TEST. WOULD YOU LIKE TO BE TESTED TODAY?
Problem: Activity Restriction  Goal: Patient will understand activity restrictions  Intervention: Work simplification/energy conservation techniques  Note: Intervention Status  Done  Intervention: Surgical restrictions if applicable  Description: Surgical restrictions if applicable.  Note: Intervention Status  Done  Intervention: Return to driving (per physician order)  Note: Intervention Status  Done  Intervention: Lifting restrictions  Description: Lifting restrictions.  Note: Intervention Status  Done     Problem: Cardiac Risk Factor  Goal: Patient will identify and understand personal cardiac risk factors  Intervention: Weight risk factor identification/risk reduction plan  Note: Intervention Status  Done  Intervention: Hypertension risk factor identification/risk reduction plan  Note: Intervention Status  Done  Intervention: Inactivity risk factor identification/risk reduction plan  Note: Intervention Status  Done  Intervention: Stress risk factor identification/risk reduction plan  Note: Intervention Status  Done  Intervention: Hyperlipidemia risk factor identification/risk reduction plan  Note: Intervention Status  Done     Problem: Discharge Information  Intervention: Home exercise program and self monitoring techniques  Note: Intervention Status  Done  Intervention: Home activity program  Note: Intervention Status  Done  Intervention: Sign and symptom recognition and plan  Note: Intervention Status  Done  Intervention: Information/referral to outpatient cardiac rehabilitation  Note: Intervention Status  Done      Yes, get tested Previously negative

## 2025-03-12 NOTE — H&P PST ADULT - REASON FOR ADMISSION
Pt verbalizes readiness for discharge. Instructions reviewed with pt by Cecelia ANTHONY. No further needs. Pt taken to car via wheelchair by CNA.   Case Type: OP Block TimeSuite: CASProceduralist: Filemon Karimi  Confirmed Surgery DateTime: 03- - 0:00PAST DateTime: 03- - 12:15Procedure: INCARCERATED VENTRAL HERNIA REPAIR WITH MESH  ERP?: UnavailableLaterality: N/ALength of Procedure: 60 Minutes  Anesthesia Type: Local Standby

## 2025-03-12 NOTE — H&P PST ADULT - NSICDXPASTMEDICALHX_GEN_ALL_CORE_FT
PAST MEDICAL HISTORY:  Autism     CA skin, basal cell     History of learning disability     History of seizure disorder     Hyperactivity     Obesity     Ventral hernia

## 2025-03-13 DIAGNOSIS — Z01.818 ENCOUNTER FOR OTHER PREPROCEDURAL EXAMINATION: ICD-10-CM

## 2025-03-13 DIAGNOSIS — K43.6 OTHER AND UNSPECIFIED VENTRAL HERNIA WITH OBSTRUCTION, WITHOUT GANGRENE: ICD-10-CM

## 2025-03-20 ENCOUNTER — NON-APPOINTMENT (OUTPATIENT)
Age: 30
End: 2025-03-20

## 2025-03-21 NOTE — ASU PATIENT PROFILE, ADULT - FALL HARM RISK - UNIVERSAL INTERVENTIONS
Bed in lowest position, wheels locked, appropriate side rails in place/Call bell, personal items and telephone in reach/Instruct patient to call for assistance before getting out of bed or chair/Non-slip footwear when patient is out of bed/[place] to call system/Physically safe environment - no spills, clutter or unnecessary equipment/Purposeful Proactive Rounding/Room/bathroom lighting operational, light cord in reach

## 2025-03-21 NOTE — ASU PATIENT PROFILE, ADULT - NSICDXPASTSURGICALHX_GEN_ALL_CORE_FT
PAST SURGICAL HISTORY:  H/O basal cell carcinoma excision     H/O myringotomy     History of tonsillectomy and adenoidectomy

## 2025-03-24 ENCOUNTER — APPOINTMENT (OUTPATIENT)
Dept: SURGERY | Facility: AMBULATORY SURGERY CENTER | Age: 30
End: 2025-03-24
Payer: MEDICARE

## 2025-03-24 ENCOUNTER — TRANSCRIPTION ENCOUNTER (OUTPATIENT)
Age: 30
End: 2025-03-24

## 2025-03-24 ENCOUNTER — OUTPATIENT (OUTPATIENT)
Dept: OUTPATIENT SERVICES | Facility: HOSPITAL | Age: 30
LOS: 1 days | Discharge: HOME | End: 2025-03-24
Payer: MEDICARE

## 2025-03-24 VITALS — OXYGEN SATURATION: 99 % | DIASTOLIC BLOOD PRESSURE: 64 MMHG | SYSTOLIC BLOOD PRESSURE: 122 MMHG | HEART RATE: 100 BPM

## 2025-03-24 VITALS
WEIGHT: 306 LBS | HEIGHT: 71 IN | RESPIRATION RATE: 20 BRPM | DIASTOLIC BLOOD PRESSURE: 83 MMHG | TEMPERATURE: 97 F | HEART RATE: 104 BPM | SYSTOLIC BLOOD PRESSURE: 123 MMHG | OXYGEN SATURATION: 94 %

## 2025-03-24 DIAGNOSIS — Z90.89 ACQUIRED ABSENCE OF OTHER ORGANS: Chronic | ICD-10-CM

## 2025-03-24 DIAGNOSIS — F79 UNSPECIFIED INTELLECTUAL DISABILITIES: ICD-10-CM

## 2025-03-24 DIAGNOSIS — E66.01 MORBID (SEVERE) OBESITY DUE TO EXCESS CALORIES: ICD-10-CM

## 2025-03-24 DIAGNOSIS — Z98.890 OTHER SPECIFIED POSTPROCEDURAL STATES: Chronic | ICD-10-CM

## 2025-03-24 DIAGNOSIS — G40.909 EPILEPSY, UNSPECIFIED, NOT INTRACTABLE, WITHOUT STATUS EPILEPTICUS: ICD-10-CM

## 2025-03-24 DIAGNOSIS — K43.6 OTHER AND UNSPECIFIED VENTRAL HERNIA WITH OBSTRUCTION, WITHOUT GANGRENE: ICD-10-CM

## 2025-03-24 DIAGNOSIS — F84.0 AUTISTIC DISORDER: ICD-10-CM

## 2025-03-24 PROCEDURE — 49594 RPR AA HRN 1ST 3-10 NCR/STRN: CPT

## 2025-03-24 PROCEDURE — C1781: CPT

## 2025-03-24 RX ORDER — ACETAMINOPHEN 500 MG/5ML
1000 LIQUID (ML) ORAL ONCE
Refills: 0 | Status: DISCONTINUED | OUTPATIENT
Start: 2025-03-24 | End: 2025-03-24

## 2025-03-24 RX ORDER — SODIUM CHLORIDE 9 G/1000ML
1000 INJECTION, SOLUTION INTRAVENOUS
Refills: 0 | Status: DISCONTINUED | OUTPATIENT
Start: 2025-03-24 | End: 2025-03-24

## 2025-03-24 RX ORDER — TRAMADOL HYDROCHLORIDE 50 MG/1
50 TABLET, COATED ORAL
Qty: 12 | Refills: 0 | Status: DISCONTINUED | COMMUNITY
Start: 2025-03-20 | End: 2025-03-24

## 2025-03-24 RX ORDER — ARIPIPRAZOLE 2 MG/1
1 TABLET ORAL
Refills: 0 | DISCHARGE

## 2025-03-24 RX ORDER — ONDANSETRON HCL/PF 4 MG/2 ML
4 VIAL (ML) INJECTION ONCE
Refills: 0 | Status: DISCONTINUED | OUTPATIENT
Start: 2025-03-24 | End: 2025-03-24

## 2025-03-24 RX ORDER — OXYCODONE 5 MG/1
5 TABLET ORAL
Qty: 12 | Refills: 0 | Status: ACTIVE | COMMUNITY
Start: 2025-03-24 | End: 1900-01-01

## 2025-03-24 RX ORDER — CARBAMAZEPINE 200 MG/1
1 TABLET ORAL
Refills: 0 | DISCHARGE

## 2025-03-24 RX ORDER — OXYCODONE HYDROCHLORIDE 30 MG/1
5 TABLET ORAL ONCE
Refills: 0 | Status: DISCONTINUED | OUTPATIENT
Start: 2025-03-24 | End: 2025-03-24

## 2025-03-24 RX ORDER — MELOXICAM 15 MG/1
1 TABLET ORAL
Refills: 0 | DISCHARGE

## 2025-03-24 RX ORDER — ATOMOXETINE 10 MG/1
1 CAPSULE ORAL
Refills: 0 | DISCHARGE

## 2025-03-24 RX ORDER — TIRZEPATIDE 7.5 MG/.5ML
7.5 INJECTION, SOLUTION SUBCUTANEOUS
Refills: 0 | DISCHARGE

## 2025-03-24 RX ORDER — HYDROMORPHONE/SOD CHLOR,ISO/PF 2 MG/10 ML
1 SYRINGE (ML) INJECTION
Refills: 0 | Status: DISCONTINUED | OUTPATIENT
Start: 2025-03-24 | End: 2025-03-24

## 2025-03-24 NOTE — BRIEF OPERATIVE NOTE - NSICDXBRIEFPROCEDURE_GEN_ALL_CORE_FT
PROCEDURES:  Repair of anterior abdominal hernia(s) (ie, epigastric, incisional, ventral, umbilical, Spigelian), 24-Mar-2025 09:01:30  Filemon Karimi

## 2025-03-24 NOTE — ASU DISCHARGE PLAN (ADULT/PEDIATRIC) - CARE PROVIDER_API CALL
Filemon Karimi  Surgery  [address] [place] [address]  Phone: [phone]  Fax: [phone]  Scheduled Appointment: 03/31/2025 08:40 AM

## 2025-03-24 NOTE — ASU DISCHARGE PLAN (ADULT/PEDIATRIC) - COMMENTS
- You will see [facility] Physician Assistant, Jen Zafar, at your postoperative visit noted above.

## 2025-03-24 NOTE — ASU DISCHARGE PLAN (ADULT/PEDIATRIC) - ASU DC SPECIAL INSTRUCTIONSFT
Diet    Eat light on the day of surgery. Nausea and vomiting can occur after anesthesia,   but usually resolve within 24 hours.  Resume normal diet the following day.      Activity    Rest!  No heavy lifting or strenuous activity.    Medications    Ibuprofen (Advil, Motrin), Naproxen (Aleve) and/or Extra-Strength Tylenol for pain.  Tramadol for severe pain only.  Your prescription was sent electronically to your pharmacy.  Remember, Tramadol is a strong narcotic pain reliever which can cause drowsiness, upset stomach and constipation.  It should always be taken with food.  You can use stool softener (Mineral Oil) or laxative (MiraLax or Dulcolax) if constipated.  An antibiotic is given during surgery.  No antibiotic needed at home.  Resume all previous medications.  Resume blood thinners the day after surgery unless told otherwise.    Wound Care    Leave surgical dressing in place.  May shower (dressing is waterproof.)  No pool, ocean, lake, hot-tub or bath for 3 weeks. If you were given an abdominal binder, please wear it as much as possible (day & night) for 2 weeks, but you must remove it for showers.  Ice packs to the area intermittently for several days help with pain and swelling.  Bruising (“black and blue”) is common.  Treatment is…Ice & Rest.       - You will see [facility] Physician Assistant, Jen Zafar, at your postoperative visit noted below.

## 2025-03-24 NOTE — PRE-ANESTHESIA EVALUATION ADULT - WEIGHT IN LBS
Attempted to leave VM, but automated message stated that number was unavailable at this time and disconnected. Will try again later.    RE: rescheduling annual on 8/24   306

## 2025-03-24 NOTE — ASU DISCHARGE PLAN (ADULT/PEDIATRIC) - FINANCIAL ASSISTANCE
[facility] provides services at a reduced cost to those who are determined to be eligible through [facility]’s financial assistance program. Information regarding [facility]’s financial assistance program can be found by going to [email or web address] or by calling [phone].

## 2025-03-28 ENCOUNTER — RX RENEWAL (OUTPATIENT)
Age: 30
End: 2025-03-28

## 2025-03-28 RX ORDER — MELOXICAM 7.5 MG/1
7.5 TABLET ORAL
Qty: 30 | Refills: 2 | Status: ACTIVE | COMMUNITY
Start: 2025-03-27 | End: 1900-01-01

## 2025-04-02 ENCOUNTER — APPOINTMENT (OUTPATIENT)
Dept: SURGERY | Facility: CLINIC | Age: 30
End: 2025-04-02
Payer: MEDICARE

## 2025-04-02 DIAGNOSIS — K43.6 OTHER AND UNSPECIFIED VENTRAL HERNIA WITH OBSTRUCTION, W/OUT GANGRENE: ICD-10-CM

## 2025-04-02 PROCEDURE — 99213 OFFICE O/P EST LOW 20 MIN: CPT

## 2025-05-29 ENCOUNTER — RX RENEWAL (OUTPATIENT)
Age: 30
End: 2025-05-29

## 2025-06-27 ENCOUNTER — EMERGENCY (EMERGENCY)
Facility: HOSPITAL | Age: 30
LOS: 0 days | Discharge: HOME | End: 2025-06-27
Attending: STUDENT IN AN ORGANIZED HEALTH CARE EDUCATION/TRAINING PROGRAM
Payer: COMMERCIAL

## 2025-06-27 VITALS
WEIGHT: 296.96 LBS | SYSTOLIC BLOOD PRESSURE: 133 MMHG | DIASTOLIC BLOOD PRESSURE: 91 MMHG | OXYGEN SATURATION: 98 % | HEART RATE: 99 BPM | TEMPERATURE: 98 F | RESPIRATION RATE: 18 BRPM | HEIGHT: 72 IN

## 2025-06-27 DIAGNOSIS — Z98.890 OTHER SPECIFIED POSTPROCEDURAL STATES: Chronic | ICD-10-CM

## 2025-06-27 DIAGNOSIS — Z90.89 ACQUIRED ABSENCE OF OTHER ORGANS: Chronic | ICD-10-CM

## 2025-06-27 PROBLEM — Z86.69 PERSONAL HISTORY OF OTHER DISEASES OF THE NERVOUS SYSTEM AND SENSE ORGANS: Chronic | Status: ACTIVE | Noted: 2025-03-12

## 2025-06-27 PROBLEM — F84.0 AUTISTIC DISORDER: Chronic | Status: ACTIVE | Noted: 2025-03-12

## 2025-06-27 PROBLEM — E66.9 OBESITY, UNSPECIFIED: Chronic | Status: ACTIVE | Noted: 2025-03-12

## 2025-06-27 PROBLEM — F90.9 ATTENTION-DEFICIT HYPERACTIVITY DISORDER, UNSPECIFIED TYPE: Chronic | Status: ACTIVE | Noted: 2025-03-12

## 2025-06-27 PROBLEM — C44.91 BASAL CELL CARCINOMA OF SKIN, UNSPECIFIED: Chronic | Status: ACTIVE | Noted: 2025-03-12

## 2025-06-27 PROBLEM — Z87.898 PERSONAL HISTORY OF OTHER SPECIFIED CONDITIONS: Chronic | Status: ACTIVE | Noted: 2025-03-12

## 2025-06-27 PROBLEM — K43.9 VENTRAL HERNIA WITHOUT OBSTRUCTION OR GANGRENE: Chronic | Status: ACTIVE | Noted: 2025-03-12

## 2025-06-27 LAB
ALBUMIN SERPL ELPH-MCNC: 4.8 G/DL — SIGNIFICANT CHANGE UP (ref 3.5–5.2)
ALP SERPL-CCNC: 59 U/L — SIGNIFICANT CHANGE UP (ref 30–115)
ALT FLD-CCNC: 22 U/L — SIGNIFICANT CHANGE UP (ref 0–41)
ANION GAP SERPL CALC-SCNC: 15 MMOL/L — HIGH (ref 7–14)
APPEARANCE UR: CLEAR — SIGNIFICANT CHANGE UP
APTT BLD: 28.5 SEC — SIGNIFICANT CHANGE UP (ref 27–39.2)
AST SERPL-CCNC: 24 U/L — SIGNIFICANT CHANGE UP (ref 0–41)
BACTERIA # UR AUTO: NEGATIVE /HPF — SIGNIFICANT CHANGE UP
BASOPHILS # BLD AUTO: 0.05 K/UL — SIGNIFICANT CHANGE UP (ref 0–0.2)
BASOPHILS NFR BLD AUTO: 0.5 % — SIGNIFICANT CHANGE UP (ref 0–1)
BILIRUB SERPL-MCNC: 0.5 MG/DL — SIGNIFICANT CHANGE UP (ref 0.2–1.2)
BILIRUB UR-MCNC: NEGATIVE — SIGNIFICANT CHANGE UP
BUN SERPL-MCNC: 13 MG/DL — SIGNIFICANT CHANGE UP (ref 10–20)
CALCIUM SERPL-MCNC: 8.7 MG/DL — SIGNIFICANT CHANGE UP (ref 8.4–10.5)
CAST: 0 /LPF — SIGNIFICANT CHANGE UP (ref 0–4)
CHLORIDE SERPL-SCNC: 102 MMOL/L — SIGNIFICANT CHANGE UP (ref 98–110)
CO2 SERPL-SCNC: 23 MMOL/L — SIGNIFICANT CHANGE UP (ref 17–32)
COLOR SPEC: YELLOW — SIGNIFICANT CHANGE UP
CREAT SERPL-MCNC: 0.8 MG/DL — SIGNIFICANT CHANGE UP (ref 0.7–1.5)
DIFF PNL FLD: NEGATIVE — SIGNIFICANT CHANGE UP
EGFR: 122 ML/MIN/1.73M2 — SIGNIFICANT CHANGE UP
EGFR: 122 ML/MIN/1.73M2 — SIGNIFICANT CHANGE UP
EOSINOPHIL # BLD AUTO: 0.25 K/UL — SIGNIFICANT CHANGE UP (ref 0–0.7)
EOSINOPHIL NFR BLD AUTO: 2.7 % — SIGNIFICANT CHANGE UP (ref 0–8)
ETHANOL SERPL-MCNC: <10 MG/DL — SIGNIFICANT CHANGE UP
GLUCOSE SERPL-MCNC: 93 MG/DL — SIGNIFICANT CHANGE UP (ref 70–99)
GLUCOSE UR QL: NEGATIVE MG/DL — SIGNIFICANT CHANGE UP
HCT VFR BLD CALC: 44.3 % — SIGNIFICANT CHANGE UP (ref 42–52)
HGB BLD-MCNC: 14.9 G/DL — SIGNIFICANT CHANGE UP (ref 14–18)
IMM GRANULOCYTES NFR BLD AUTO: 0.9 % — HIGH (ref 0.1–0.3)
INR BLD: 0.99 RATIO — SIGNIFICANT CHANGE UP (ref 0.65–1.3)
KETONES UR QL: NEGATIVE MG/DL — SIGNIFICANT CHANGE UP
LACTATE SERPL-SCNC: 1.3 MMOL/L — SIGNIFICANT CHANGE UP (ref 0.7–2)
LEUKOCYTE ESTERASE UR-ACNC: NEGATIVE — SIGNIFICANT CHANGE UP
LIDOCAIN IGE QN: 23 U/L — SIGNIFICANT CHANGE UP (ref 7–60)
LYMPHOCYTES # BLD AUTO: 1.33 K/UL — SIGNIFICANT CHANGE UP (ref 1.2–3.4)
LYMPHOCYTES # BLD AUTO: 14.6 % — LOW (ref 20.5–51.1)
MCHC RBC-ENTMCNC: 29 PG — SIGNIFICANT CHANGE UP (ref 27–31)
MCHC RBC-ENTMCNC: 33.6 G/DL — SIGNIFICANT CHANGE UP (ref 32–37)
MCV RBC AUTO: 86.4 FL — SIGNIFICANT CHANGE UP (ref 80–94)
MONOCYTES # BLD AUTO: 0.93 K/UL — HIGH (ref 0.1–0.6)
MONOCYTES NFR BLD AUTO: 10.2 % — HIGH (ref 1.7–9.3)
NEUTROPHILS # BLD AUTO: 6.49 K/UL — SIGNIFICANT CHANGE UP (ref 1.4–6.5)
NEUTROPHILS NFR BLD AUTO: 71.1 % — SIGNIFICANT CHANGE UP (ref 42.2–75.2)
NITRITE UR-MCNC: NEGATIVE — SIGNIFICANT CHANGE UP
NRBC BLD AUTO-RTO: 0 /100 WBCS — SIGNIFICANT CHANGE UP (ref 0–0)
PH UR: 7.5 — SIGNIFICANT CHANGE UP (ref 5–8)
PLATELET # BLD AUTO: 203 K/UL — SIGNIFICANT CHANGE UP (ref 130–400)
PMV BLD: 9.3 FL — SIGNIFICANT CHANGE UP (ref 7.4–10.4)
POTASSIUM SERPL-MCNC: 3.8 MMOL/L — SIGNIFICANT CHANGE UP (ref 3.5–5)
POTASSIUM SERPL-SCNC: 3.8 MMOL/L — SIGNIFICANT CHANGE UP (ref 3.5–5)
PROT SERPL-MCNC: 7.2 G/DL — SIGNIFICANT CHANGE UP (ref 6–8)
PROT UR-MCNC: 30 MG/DL
PROTHROM AB SERPL-ACNC: 11.7 SEC — SIGNIFICANT CHANGE UP (ref 9.95–12.87)
RBC # BLD: 5.13 M/UL — SIGNIFICANT CHANGE UP (ref 4.7–6.1)
RBC # FLD: 12.1 % — SIGNIFICANT CHANGE UP (ref 11.5–14.5)
RBC CASTS # UR COMP ASSIST: 38 /HPF — HIGH (ref 0–4)
SODIUM SERPL-SCNC: 140 MMOL/L — SIGNIFICANT CHANGE UP (ref 135–146)
SP GR SPEC: >1.03 — HIGH (ref 1–1.03)
SQUAMOUS # UR AUTO: 1 /HPF — SIGNIFICANT CHANGE UP (ref 0–5)
UROBILINOGEN FLD QL: 1 MG/DL — SIGNIFICANT CHANGE UP (ref 0.2–1)
WBC # BLD: 9.13 K/UL — SIGNIFICANT CHANGE UP (ref 4.8–10.8)
WBC # FLD AUTO: 9.13 K/UL — SIGNIFICANT CHANGE UP (ref 4.8–10.8)
WBC UR QL: 3 /HPF — SIGNIFICANT CHANGE UP (ref 0–5)

## 2025-06-27 PROCEDURE — 73090 X-RAY EXAM OF FOREARM: CPT | Mod: 26,RT

## 2025-06-27 PROCEDURE — 80053 COMPREHEN METABOLIC PANEL: CPT

## 2025-06-27 PROCEDURE — 72170 X-RAY EXAM OF PELVIS: CPT

## 2025-06-27 PROCEDURE — 29130 APPL FINGER SPLINT STATIC: CPT | Mod: RT

## 2025-06-27 PROCEDURE — 73030 X-RAY EXAM OF SHOULDER: CPT | Mod: RT

## 2025-06-27 PROCEDURE — 70498 CT ANGIOGRAPHY NECK: CPT | Mod: 26

## 2025-06-27 PROCEDURE — 83690 ASSAY OF LIPASE: CPT

## 2025-06-27 PROCEDURE — 36415 COLL VENOUS BLD VENIPUNCTURE: CPT

## 2025-06-27 PROCEDURE — 99291 CRITICAL CARE FIRST HOUR: CPT | Mod: 25

## 2025-06-27 PROCEDURE — 73090 X-RAY EXAM OF FOREARM: CPT | Mod: RT

## 2025-06-27 PROCEDURE — 73140 X-RAY EXAM OF FINGER(S): CPT | Mod: RT

## 2025-06-27 PROCEDURE — 85730 THROMBOPLASTIN TIME PARTIAL: CPT

## 2025-06-27 PROCEDURE — 73110 X-RAY EXAM OF WRIST: CPT | Mod: 26,RT

## 2025-06-27 PROCEDURE — 72125 CT NECK SPINE W/O DYE: CPT

## 2025-06-27 PROCEDURE — 83605 ASSAY OF LACTIC ACID: CPT

## 2025-06-27 PROCEDURE — 73080 X-RAY EXAM OF ELBOW: CPT | Mod: RT

## 2025-06-27 PROCEDURE — 81001 URINALYSIS AUTO W/SCOPE: CPT

## 2025-06-27 PROCEDURE — 72125 CT NECK SPINE W/O DYE: CPT | Mod: 26

## 2025-06-27 PROCEDURE — 71260 CT THORAX DX C+: CPT

## 2025-06-27 PROCEDURE — 85610 PROTHROMBIN TIME: CPT

## 2025-06-27 PROCEDURE — 73080 X-RAY EXAM OF ELBOW: CPT | Mod: 26,RT

## 2025-06-27 PROCEDURE — 73110 X-RAY EXAM OF WRIST: CPT | Mod: RT

## 2025-06-27 PROCEDURE — 99285 EMERGENCY DEPT VISIT HI MDM: CPT

## 2025-06-27 PROCEDURE — 74177 CT ABD & PELVIS W/CONTRAST: CPT | Mod: 26

## 2025-06-27 PROCEDURE — 73130 X-RAY EXAM OF HAND: CPT | Mod: 26,RT

## 2025-06-27 PROCEDURE — 72170 X-RAY EXAM OF PELVIS: CPT | Mod: 26

## 2025-06-27 PROCEDURE — 71045 X-RAY EXAM CHEST 1 VIEW: CPT | Mod: 26

## 2025-06-27 PROCEDURE — 29125 APPL SHORT ARM SPLINT STATIC: CPT | Mod: RT

## 2025-06-27 PROCEDURE — 82962 GLUCOSE BLOOD TEST: CPT

## 2025-06-27 PROCEDURE — 74177 CT ABD & PELVIS W/CONTRAST: CPT

## 2025-06-27 PROCEDURE — 73130 X-RAY EXAM OF HAND: CPT | Mod: RT

## 2025-06-27 PROCEDURE — 80307 DRUG TEST PRSMV CHEM ANLYZR: CPT

## 2025-06-27 PROCEDURE — 73140 X-RAY EXAM OF FINGER(S): CPT | Mod: 26,XE,RT

## 2025-06-27 PROCEDURE — 70450 CT HEAD/BRAIN W/O DYE: CPT

## 2025-06-27 PROCEDURE — 99284 EMERGENCY DEPT VISIT MOD MDM: CPT | Mod: 25

## 2025-06-27 PROCEDURE — 73030 X-RAY EXAM OF SHOULDER: CPT | Mod: 26,RT

## 2025-06-27 PROCEDURE — 70498 CT ANGIOGRAPHY NECK: CPT

## 2025-06-27 PROCEDURE — 85025 COMPLETE CBC W/AUTO DIFF WBC: CPT

## 2025-06-27 PROCEDURE — 71045 X-RAY EXAM CHEST 1 VIEW: CPT

## 2025-06-27 PROCEDURE — 70450 CT HEAD/BRAIN W/O DYE: CPT | Mod: 26

## 2025-06-27 PROCEDURE — 71260 CT THORAX DX C+: CPT | Mod: 26

## 2025-06-27 RX ORDER — ACETAMINOPHEN 500 MG/5ML
650 LIQUID (ML) ORAL ONCE
Refills: 0 | Status: COMPLETED | OUTPATIENT
Start: 2025-06-27 | End: 2025-06-27

## 2025-06-27 RX ADMIN — Medication 650 MILLIGRAM(S): at 18:54
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
## 2025-06-27 NOTE — ED PROVIDER NOTE - PHYSICAL EXAMINATION
Constitutional: Well developed, well nourished. NAD  TRAUMA: ABC intact. GCS 15. FAST negative.  Head: Normocephalic, atraumatic.  Eyes: PERRL. EOMI. No Raccoon eyes.   ENT: No nasal discharge. No septal hematoma. No Hutchinson sign. Mucous membranes moist.  Neck: Supple. Painless ROM. No midline tenderness, stepoffs. faint ecchymosis to the right anterolateral neck.   Cardiovascular: Normal S1, S2. Regular rate and rhythm. No murmurs, rubs, or gallops.  Pulmonary: Normal respiratory rate and effort. Lungs clear to auscultation bilaterally. No wheezing, rales, or rhonchi.  CHEST: No chest wall tenderness, crepitus.  Abdominal: Soft. Nondistended. Nontender. No rebound, guarding, rigidity.  BACK: No midline T/L/S tenderness, stepoffs. No saddle paresthesia.  Extremities: Pelvis stable. No traumatic deformities. tenderness to the base of the right thumb, no scaphoid tenderness. mild pain with ROM of the right shoulder, no bony tenderness to the right shoulder, right humerus, or obvious bony tenderness of the elbow. neurovascularly intact RUE.   Skin. superficial abrasion to the elbows bilaterally.   Neuro: AAOx3. Strength 5/5 in all extremities. Sensation intact throughout. No focal neurological deficits.  Psych: Normal mood. Normal affect.
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
## 2025-06-27 NOTE — ED PROVIDER NOTE - CARE PROVIDER_API CALL
Eduardo Noonan  Surgery of the Hand  [address] [place] [address]  Phone: [phone]  Fax: [phone]  Follow Up Time:

## 2025-06-27 NOTE — ED PROVIDER NOTE - WORK/EXCUSE FORM DATE
Per request of Dr. Jean, need to call  and find out what is going on with Deyaniraheatherelder. Pt has npt been seen by Dr. Jean since 2018 and now received fax for PT. Dr. Jean needs to see patient.      1st Attempt: Called Gricel Chawla RN's number at [facility] and there is another name on message. Left message for someone to call office back.  CP forms are on writer's desk.    30-Jun-2025
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
## 2025-06-27 NOTE — ED PROCEDURE NOTE - CPROC ED POST PROC CARE GUIDE1
Verbal/written post procedure instructions were given to patient/caregiver./Instructed patient/caregiver to follow-up with primary care physician./Instructed patient/caregiver regarding signs and symptoms of infection./Elevate the injured extremity as instructed./Keep the cast/splint/dressing clean and dry. Never
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
## 2025-06-27 NOTE — ED PROVIDER NOTE - PATIENT PORTAL LINK FT
You can access the FollowMyHealth Patient Portal offered by [facility] by registering at the following website: [email or web address]. By joining [employer]’s FollowMyHealth portal, you will also be able to view your health information using other applications (apps) compatible with our system.

## 2025-06-30 ENCOUNTER — APPOINTMENT (OUTPATIENT)
Dept: ORTHOPEDIC SURGERY | Facility: CLINIC | Age: 30
End: 2025-06-30
Payer: COMMERCIAL

## 2025-06-30 PROBLEM — S69.91XA THUMB INJURY, RIGHT, INITIAL ENCOUNTER: Status: ACTIVE | Noted: 2025-06-30

## 2025-06-30 PROBLEM — Z87.898 HISTORY OF SEIZURE: Status: RESOLVED | Noted: 2022-10-13 | Resolved: 2025-06-30

## 2025-06-30 PROCEDURE — 73140 X-RAY EXAM OF FINGER(S): CPT | Mod: RT

## 2025-06-30 PROCEDURE — 99203 OFFICE O/P NEW LOW 30 MIN: CPT | Mod: ACP

## 2025-07-01 ENCOUNTER — NON-APPOINTMENT (OUTPATIENT)
Age: 30
End: 2025-07-01

## 2025-07-08 ENCOUNTER — APPOINTMENT (OUTPATIENT)
Dept: MRI IMAGING | Facility: CLINIC | Age: 30
End: 2025-07-08
Payer: COMMERCIAL

## 2025-07-08 PROCEDURE — 73218 MRI UPPER EXTREMITY W/O DYE: CPT | Mod: RT

## 2025-07-14 ENCOUNTER — APPOINTMENT (OUTPATIENT)
Dept: ORTHOPEDIC SURGERY | Facility: CLINIC | Age: 30
End: 2025-07-14
Payer: COMMERCIAL

## 2025-07-14 ENCOUNTER — NON-APPOINTMENT (OUTPATIENT)
Age: 30
End: 2025-07-14

## 2025-07-14 PROBLEM — S63.641A SPRAIN OF METACARPOPHALANGEAL (MCP) JOINT OF RIGHT THUMB, INITIAL ENCOUNTER: Status: ACTIVE | Noted: 2025-07-14

## 2025-07-14 PROCEDURE — 99202 OFFICE O/P NEW SF 15 MIN: CPT

## 2025-08-27 ENCOUNTER — APPOINTMENT (OUTPATIENT)
Dept: ORTHOPEDIC SURGERY | Facility: CLINIC | Age: 30
End: 2025-08-27